# Patient Record
Sex: FEMALE | Race: WHITE | NOT HISPANIC OR LATINO | Employment: OTHER | URBAN - METROPOLITAN AREA
[De-identification: names, ages, dates, MRNs, and addresses within clinical notes are randomized per-mention and may not be internally consistent; named-entity substitution may affect disease eponyms.]

---

## 2020-09-02 ENCOUNTER — HISTORICAL (OUTPATIENT)
Dept: ADMINISTRATIVE | Facility: HOSPITAL | Age: 75
End: 2020-09-02

## 2022-06-14 ENCOUNTER — OFFICE VISIT (OUTPATIENT)
Dept: GASTROENTEROLOGY | Facility: CLINIC | Age: 77
End: 2022-06-14
Payer: MEDICARE

## 2022-06-14 VITALS
OXYGEN SATURATION: 98 % | TEMPERATURE: 98 F | SYSTOLIC BLOOD PRESSURE: 170 MMHG | DIASTOLIC BLOOD PRESSURE: 80 MMHG | WEIGHT: 102.81 LBS | BODY MASS INDEX: 17.55 KG/M2 | HEART RATE: 59 BPM | RESPIRATION RATE: 20 BRPM | HEIGHT: 64 IN

## 2022-06-14 DIAGNOSIS — K58.0 IRRITABLE BOWEL SYNDROME WITH DIARRHEA: Primary | ICD-10-CM

## 2022-06-14 PROBLEM — K58.1 IRRITABLE BOWEL SYNDROME WITH CONSTIPATION: Status: RESOLVED | Noted: 2022-06-14 | Resolved: 2022-06-14

## 2022-06-14 PROBLEM — K58.1 IRRITABLE BOWEL SYNDROME WITH CONSTIPATION: Status: ACTIVE | Noted: 2022-06-14

## 2022-06-14 PROBLEM — K58.2 IRRITABLE BOWEL SYNDROME WITH BOTH CONSTIPATION AND DIARRHEA: Status: ACTIVE | Noted: 2022-06-14

## 2022-06-14 PROCEDURE — 99214 OFFICE O/P EST MOD 30 MIN: CPT | Mod: PBBFAC | Performed by: INTERNAL MEDICINE

## 2022-06-14 RX ORDER — HYOSCYAMINE SULFATE 0.125 MG
0.12 TABLET ORAL
COMMUNITY
Start: 2022-02-15 | End: 2022-08-14

## 2022-06-14 RX ORDER — ERGOCALCIFEROL 1.25 MG/1
CAPSULE ORAL
COMMUNITY

## 2022-06-14 RX ORDER — ACYCLOVIR 200 MG/1
CAPSULE ORAL
COMMUNITY

## 2022-06-14 RX ORDER — ESTRADIOL 0.1 MG/G
CREAM VAGINAL
COMMUNITY

## 2022-06-14 RX ORDER — L-METHYLFOLATE-ALGAE-VIT B12-B6 CAP 3-90.314-2-35 MG 3-90.314-2-35 MG
1 CAP ORAL DAILY
COMMUNITY
Start: 2022-01-04

## 2022-06-14 RX ORDER — CHOLESTYRAMINE 4 G/9G
POWDER, FOR SUSPENSION ORAL
COMMUNITY

## 2022-06-14 RX ORDER — SPIRONOLACTONE 25 MG/1
TABLET ORAL
COMMUNITY

## 2022-06-14 NOTE — ASSESSMENT & PLAN NOTE
Long history of altered bowel habits. Has had constipation, mixed stools, and more recently over the years problems with diarrhea  Diagnosed with IBS, which I agree with. Has mixed IBS in the past now with more diarrhea predominant features  We discussed this diagnosis, relationship to Gut brain interaction. She is presently not on any neuromodulating agents.  Discussed lifestyle modifications. She already has significant dietary modifications  She started a mixture of chamomile, peppermint, wild yam, valerian recently with improvement in symptoms. We reviewed these medications individually, right now I do not have significant issues with her continuing this regimen.  She will notify me with any change in symptoms.

## 2022-06-14 NOTE — PROGRESS NOTES
Gastroenterology and Hepatology        Established Patient Note         TODAY'S VISIT DATE:  6/14/2022  The patient's last visit with me was on Visit date not found.     PCP: Araseli Alvarado      Referring MD:   No ref. provider found    History of Present Illness:    Ms. Pineda is a 77 year old with IBS here for follow-up.    She reports being diagnosed with IBS 10 years ago. Last colonoscopy May 2018 with Dr. Cash as below:    May 10, 2018: Colonoscopy: one 3 mm TA removed, o/w normal colon. Random bxs negative for microscopic colitis.    In January 2022 she had obstructing kidney stone and COVID infection as well. Symptoms seemed to worsen after this.    She had severe constipation most of her life. Around time of diagnosis she was having mixed diarrhea and constipation. Over the years she began having more diarrhea. At baseline she would have 1-2 BMs a day, consistency usually soft. She would get episodes of diarrhea which could manifest as watery to loose stools up to 7 BMs a day and nocturnal stools.  She would try to avoid dairy, heavy creams. Fruits give her significant issues. Stress is a trigger. She would use imodium and hyoscamine when these episodes occurred. sometimes imodium would cause constipation.    When I initially evaluated patient she had noticed improvement with stools on a combination of chamomile, peppermint, wild yam, valerian.  Today she reports doing very well.  She started [employer] supplements - in the morning she takes a supplement consisting of amino acids and vitamin/minerals, probiotics (Lactobacillus) at night, and digestive enzymes with meals.  In addition she is taking 2 gms of cholestyramine around lunchtime.  If she feels like she is developing constipation she will hold the cholestyramine which helps.  She is also trying to follow a paleo diet.   On average she is having 2 formed bowel movements a day.   Weight is stable. She has never had any  rectal bleeding.    Diagnosed NHL in 2003, she stopped all meats and sugar following this diagnosis.    She denies significant tobacco, alcohol, or recreational drug use. She denies any FH of GI malignancies.      Review of Systems   Constitutional: Negative for appetite change and unexpected weight change.   HENT: Negative for trouble swallowing.    Respiratory: Negative for chest tightness.    Cardiovascular: Negative.    Gastrointestinal: Negative for abdominal pain, change in bowel habit and change in bowel habit.   Musculoskeletal: Negative.    Neurological: Negative.    Psychiatric/Behavioral: Negative.    All other systems reviewed and are negative.         Medical/Surgical History:   Past Medical History:   Diagnosis Date    Irritable bowel syndrome with both constipation and diarrhea      History reviewed. No pertinent surgical history.      Family History:   Family History   Problem Relation Age of Onset    Heart disease Mother     Heart disease Father         Social History:   Social History     Socioeconomic History    Marital status:    Tobacco Use    Smoking status: Never Smoker    Smokeless tobacco: Never Used   Substance and Sexual Activity    Alcohol use: Yes     Alcohol/week: 5.0 standard drinks     Types: 5 Glasses of wine per week    Drug use: Never        Review of patient's allergies indicates:   Allergen Reactions    Levofloxacin      Other reaction(s): severe muscle pain    Phenazopyridine      Other reaction(s): Muscle ache    Sulfamethoxazole-trimethoprim      Other reaction(s): muscle pain/soreness    Cefdinir Rash     Other reaction(s): Muscle ache       Current Medications:   Outpatient Medications Marked as Taking for the 6/14/22 encounter (Office Visit) with Umu Cárdenas MD   Medication Sig Dispense Refill    acyclovir (ZOVIRAX) 200 MG capsule acyclovir 200 mg capsule      cholestyramine (QUESTRAN) 4 gram packet cholestyramine (with sugar) 4 gram powder for  "susp in a packet   DISSOLVE & TAKE 1 POWDER PACKET BY MOUTH once DAILY      cholestyramine-aspartame (QUESTRAN/PREVALITE LIGHT) 4 gram Powd Take by mouth.      ergocalciferol (ERGOCALCIFEROL) 50,000 unit Cap ergocalciferol (vitamin D2) 1,250 mcg (50,000 unit) capsule   TAKE 1 CAPSULE BY MOUTH EVERY 2 WEEKS ON SUNDAYS      estradioL (ESTRACE) 0.01 % (0.1 mg/gram) vaginal cream Estrace 0.01% (0.1 mg/gram) vaginal cream   Insert 1 applicatorful every month by vaginal route.      hyoscyamine (ANASPAZ,LEVSIN) 0.125 mg Tab Take 0.125 mg by mouth.      METANX, ALGAL OIL, 3 mg-35 mg-2 mg -90.314 mg Cap Take 1 capsule by mouth once daily.      spironolactone (ALDACTONE) 25 MG tablet spironolactone 25 mg tablet   Take 1 tab PO QD prn          Vital Signs:  BP (!) 170/80 (BP Location: Left arm, Patient Position: Sitting)   Pulse (!) 59   Temp 98.4 °F (36.9 °C)   Resp 20   Ht 5' 4" (1.626 m)   Wt 46.6 kg (102 lb 12.8 oz)   SpO2 98%   BMI 17.65 kg/m²      Physical Exam  Constitutional:       Appearance: Normal appearance.   HENT:      Head: Normocephalic and atraumatic.      Mouth/Throat:      Mouth: Mucous membranes are moist.   Eyes:      Conjunctiva/sclera: Conjunctivae normal.   Cardiovascular:      Rate and Rhythm: Normal rate and regular rhythm.   Pulmonary:      Effort: Pulmonary effort is normal.      Breath sounds: Normal breath sounds.   Abdominal:      General: Bowel sounds are normal.      Palpations: Abdomen is soft.   Musculoskeletal:         General: Normal range of motion.      Cervical back: Normal range of motion.   Skin:     General: Skin is warm.   Neurological:      General: No focal deficit present.      Mental Status: She is alert and oriented to person, place, and time. Mental status is at baseline.   Psychiatric:         Mood and Affect: Mood normal.         Behavior: Behavior normal.         Labs: Reviewed  WBC   Date Value Ref Range Status   01/06/2022 16.3 (H) 4.5 - 11.5 x10(3)/mcL Final "     Hgb   Date Value Ref Range Status   01/06/2022 16.0 12.0 - 16.0 gm/dL Final     Hct   Date Value Ref Range Status   01/06/2022 47.9 (H) 37.0 - 47.0 % Final     MCV   Date Value Ref Range Status   01/06/2022 97.4 (H) 80.0 - 94.0 fL Final       Carbon Dioxide   Date Value Ref Range Status   01/06/2022 21 (L) 23 - 31 mmol/L Final     Blood Urea Nitrogen   Date Value Ref Range Status   01/06/2022 17.8 9.8 - 20.1 mg/dL Final     Creatinine   Date Value Ref Range Status   01/06/2022 0.99 0.55 - 1.02 mg/dL Final     Bilirubin Direct   Date Value Ref Range Status   01/06/2022 0.3 0.0 - 0.5 mg/dL Final     Bilirubin Indirect   Date Value Ref Range Status   01/06/2022 0.80 0.00 - 0.80 mg/dL Final     Alkaline Phosphatase   Date Value Ref Range Status   01/06/2022 58 40 - 150 unit/L Final     Aspartate Aminotransferase   Date Value Ref Range Status   01/06/2022 53 (H) 5 - 34 unit/L Final     Alanine Aminotransferase   Date Value Ref Range Status   01/06/2022 22 0 - 55 unit/L Final     Protein Total   Date Value Ref Range Status   01/06/2022 7.9 (H) 5.8 - 7.6 gm/dL Final     Albumin Level   Date Value Ref Range Status   01/06/2022 4.4 3.4 - 4.8 gm/dL Final       No results found for: INR, PT, APTT       Assessment/Plan:    Problem List Items Addressed This Visit        GI    Irritable bowel syndrome with both constipation and diarrhea - Primary     Long history of altered bowel habits, bloating, abdominal pain. Has had constipation, mixed stools, and more recently over the years problems with diarrhea  Diagnosed with IBS. Has mixed IBS in the past now with more diarrhea predominant features  We discussed this diagnosis, relationship to Gut brain interaction. She is presently not on any neuromodulating agents.  Discussed lifestyle modifications. She is doing well on a paleo diet.  Symptoms are much improved with [employer] supplements.    We reviewed these supplements individually, right now I do not have any issues with her  continuing this regimen.  She will notify me with any change in symptoms.                             Follow up: 6 months

## 2022-06-14 NOTE — ASSESSMENT & PLAN NOTE
Long history of altered bowel habits, bloating, abdominal pain. Has had constipation, mixed stools, and more recently over the years problems with diarrhea  Diagnosed with IBS. Has mixed IBS in the past now with more diarrhea predominant features  We discussed this diagnosis, relationship to Gut brain interaction. She is presently not on any neuromodulating agents.  Discussed lifestyle modifications. She is doing well on a paleo diet.  Symptoms are much improved with [employer] supplements.    We reviewed these supplements individually, right now I do not have any issues with her continuing this regimen.  She will notify me with any change in symptoms.